# Patient Record
Sex: FEMALE | Race: WHITE | NOT HISPANIC OR LATINO | Employment: OTHER | URBAN - METROPOLITAN AREA
[De-identification: names, ages, dates, MRNs, and addresses within clinical notes are randomized per-mention and may not be internally consistent; named-entity substitution may affect disease eponyms.]

---

## 2024-06-29 ENCOUNTER — HOSPITAL ENCOUNTER (EMERGENCY)
Facility: CLINIC | Age: 24
Discharge: HOME | End: 2024-06-29
Attending: EMERGENCY MEDICINE | Admitting: EMERGENCY MEDICINE
Payer: COMMERCIAL

## 2024-06-29 VITALS
SYSTOLIC BLOOD PRESSURE: 108 MMHG | OXYGEN SATURATION: 95 % | RESPIRATION RATE: 18 BRPM | HEART RATE: 72 BPM | TEMPERATURE: 98.8 F | DIASTOLIC BLOOD PRESSURE: 71 MMHG

## 2024-06-29 DIAGNOSIS — F32.A DEPRESSION, UNSPECIFIED DEPRESSION TYPE: ICD-10-CM

## 2024-06-29 PROBLEM — F33.9 RECURRENT MAJOR DEPRESSIVE DISORDER (H): Status: ACTIVE | Noted: 2024-06-29

## 2024-06-29 PROBLEM — F41.1 GENERALIZED ANXIETY DISORDER: Status: ACTIVE | Noted: 2024-06-29

## 2024-06-29 PROCEDURE — 99283 EMERGENCY DEPT VISIT LOW MDM: CPT | Performed by: EMERGENCY MEDICINE

## 2024-06-29 PROCEDURE — 99284 EMERGENCY DEPT VISIT MOD MDM: CPT | Performed by: EMERGENCY MEDICINE

## 2024-06-29 RX ORDER — DAPSONE 100 MG/1
100 TABLET ORAL DAILY
COMMUNITY

## 2024-06-29 RX ORDER — PREDNISONE 5 MG/1
5 TABLET ORAL DAILY
COMMUNITY

## 2024-06-29 RX ORDER — COLCHICINE 0.6 MG/1
0.6 TABLET ORAL 2 TIMES DAILY
COMMUNITY

## 2024-06-29 ASSESSMENT — ACTIVITIES OF DAILY LIVING (ADL)
ADLS_ACUITY_SCORE: 35

## 2024-06-29 ASSESSMENT — COLUMBIA-SUICIDE SEVERITY RATING SCALE - C-SSRS
2. HAVE YOU ACTUALLY HAD ANY THOUGHTS OF KILLING YOURSELF IN THE PAST MONTH?: NO
6. HAVE YOU EVER DONE ANYTHING, STARTED TO DO ANYTHING, OR PREPARED TO DO ANYTHING TO END YOUR LIFE?: NO
1. IN THE PAST MONTH, HAVE YOU WISHED YOU WERE DEAD OR WISHED YOU COULD GO TO SLEEP AND NOT WAKE UP?: NO

## 2024-06-29 NOTE — ED PROVIDER NOTES
"    [facility] EMERGENCY DEPARTMENT ([facility])    6/29/24       ED PROVIDER NOTE    History     Chief Complaint   Patient presents with    Depression     Increasing depression and anxiety, \"I really need a therapist, the last few months I have been struggling mentally,\" pt states \"I think it is due to my autoimmune medications, tearful in triage.\"     HPI  Jolie Najera is a 24 year old female with a past medical history of depression and anxiety, who presents to the ED for evaluation of depression.     Patient reports that she is not suicidal but she is very struggling with depression.  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  She reports that she is been try to manage this on her own that she knows she cannot do anymore.  She does not follow with any outpatient mental health providers at this time including no therapist, psychiatrist.  She is not on any mental health medications.  In addition to the Otezla she is also on prednisone.  She reports in the past she has been diagnosed with depression and anxiety but is not currently being managed for this.      She does note vaginal bleeding, notes that she has had her period \"3 times this month\".  Is unclear about whether or not she could be pregnant.  She has no abdominal pain, tenderness or pelvic pain or tenderness.  She does not have any other medical concerns or complaints at this time.    Past Medical History  Past Medical History:   Diagnosis Date    Behcet recurrent disease (H)      Past Surgical History:   Procedure Laterality Date    ENT SURGERY      tonsillectomy and adenoidectomy     apremilast (OTEZLA) 30 MG tablet  colchicine (COLCRYS) 0.6 MG tablet  dapsone (ACZONE) 100 MG tablet  ESCITALOPRAM OXALATE PO  GABAPENTIN PO  GABAPENTIN PO  norgestimate-ethinyl estradiol (ORTHO-CYCLEN, SPRINTEC) 0.25-35 MG-MCG tablet  predniSONE (DELTASONE) 5 MG " tablet  traZODone (DESYREL) 100 MG tablet      No Known Allergies  Family History  No family history on file.  Social History   Social History     Tobacco Use    Smoking status: Some Days   Substance Use Topics    Alcohol use: No    Drug use: No      A complete review of systems was performed with pertinent positives and negatives noted in the HPI, and all other systems negative.    Physical Exam   BP: 109/72  Pulse: 90  Temp: 98.6  F (37  C)  Resp: 16  SpO2: 100 %  Physical Exam  General: awake, alert, NAD  Head: normal cephalic  HEENT: pupils equal, conjugate gaze intact  Neck: Supple  CV: regular rate and rhythm without murmur  Lungs: clear to auscultation  Abd: soft, non-tender, no guarding, no peritoneal signs  EXT: lower extremities without swelling or edema  Neuro: awake, answers questions appropriately. No focal deficits noted   Psych: Patient is  dressed no pressured speech or flight of ideas.  Does not appear to be responding to internal stimuli.  Appropriate for the weather, clean and groomed.  Makes good eye contact.  Endorses increasing depression but denies suicidal ideation.  Denies plan or recent attempt.    ED Course, Procedures, & Data      Procedures         No results found for any visits on 06/29/24.  Medications - No data to display  Labs Ordered and Resulted from Time of ED Arrival to Time of ED Departure - No data to display  No orders to display          Critical care was not performed.     Medical Decision Making  The patient's presentation was of high complexity (a chronic illness severe exacerbation, progression, or side effect of treatment).    The patient's evaluation involved:  ordering and/or review of 1 test(s) in this encounter (see separate area of note for details)  discussion of management or test interpretation with another health professional (mental health )    The patient's management necessitated moderate risk (making referrals for outpatient therapies based off of  ER evaluation).    Assessment & Plan    Marielos is a 24-year-old female who presents with depression, no suicidal ideation.  She is in the emergency department requesting expedited outpatient resources for her mental health.    Behavioral Health DEC assessment completed with the  recommending discharge with therapy.  Do feel the patient probably needs psychiatry involvement as well, would probably be better if she gets psychiatry through [facility] given that this could be related to her medications for Otezla.  Will recommend and put in a referral for prescriber through the [facility]  should there be a large delay in getting in with [facility].  See separate DEC note from today's date for details on the assessment.      Asking to get urine pregnancy test to rule out ectopic pregnancy as a cause of her abnormal uterine bleeding as a potential life-threatening cause of vaginal bleeding.  Patient declined at this time she would prefer to go home and follow-up with OB/GYN.  Stable vital signs, has no abdominal or pelvic pain, understands my concern she will be discharged per her request.      I have reviewed the nursing notes. I have reviewed the findings, diagnosis, plan and need for follow up with the patient.    New Prescriptions    No medications on file       Final diagnoses:   Depression, unspecified depression type       I, Js Barclay, am serving as a trained medical scribe to document services personally performed by Carlos Rodriguez MD based on the provider's statements to me on June 29, 2024.  This document has been checked and approved by the attending provider.    I, Carlos Rodriguez MD, was physically present and have reviewed and verified the accuracy of this note documented by Js Barclay medical scribe.      Carlos Rodriguez MD  [facility] EMERGENCY DEPARTMENT  6/29/2024     Carlos Rodriguez MD  06/29/24 2058

## 2024-06-30 NOTE — DISCHARGE INSTRUCTIONS
[facility] SCHEDULING:  Today you were seen by a licensed mental health professional through [facility] Behavioral Healthcare Providers ([facility])  for a crisis assessment in the Emergency Department at [facility].  It is recommended that you follow up with your estabished providers (psychiatrist, mental health therapist, and/or primary care doctor - as relevant) as soon as possible. Coordinators from [facility] will be calling you in the next 24-48 hours to ensure that you have the resources you need.  You can also contact [facility] coordinators directly at [phone].    You have been scheduled the following appointments:     Type: Teletherapy  Date: Tuesday, 7/2/2024  Time: 5:00 pm - 5:45 pm  Provider: Sugar Erwin MA  [facility]  Location: [facility]win, [address] [place] [address], [address], [place] [address]  Phone: [phone]  Patient Instructions  I am on [address], brick building, [address] (1st building when pulling in the parking lot) Go past [facility] next to [facility] on [address]. Next right. Can do U turns on [address] There are 4 offices. I am in the basement. Do not arrive early, I do not have a waiting room. Upstairs is a restroom. For Telehealth, Please have the customer call or text [phone] for Zoom link      We are making a referral to the transition clinic for bridging telemedicine appointments (med management) until your scheduled appointment.  They will be reaching out to you via e-mail or phone in the coming days.  They can be reached at [phone].       We are making a referral to [facility] Psychiatry on your behalf. They should be reaching out to you directly. If you do not hear from them in the next week, call them at [phone].      [facility] maintains an extensive network of licensed behavioral health providers to connect patients with the services they need.  We do not charge providers a fee to participate in our referral network.  We match patients with providers based on a  "patient s specific needs, insurance coverage, and location.  Our first effort will be to refer you to a provider within your care system, and will utilize providers outside your care system as needed.         Aftercare Plan  If I am feeling unsafe or I am in a crisis, I will:   Contact my established care providers   Call the [place] Suicide Prevention Lifeline: 988  Go to the nearest emergency room   Call 911     Warning signs that I or other people might notice when a crisis is developing for me: I shut myself off from others, I am irritable, and I am short with the kids    Things I am able to do on my own to cope or help me feel better: self-talk, positive affirmations, squeeze ice cubes     Things that I am able to do with others to cope or help me better: go for walks in nature      Changes I can make to support my mental health and wellness: follow up with [facility] resources     People in my life that I can ask for help: my partner, Niharika, and my sister Gloria     [facility] has a mental health crisis team you can call 24/7: [facility] Crisis  [phone]      Crisis Lines  Crisis Text Line  Text 158921  You will be connected with a trained live crisis counselor to provide support.    Por espanol, texto  JINA a 882910 o texto a 442-AYUDAME en WhatsApp    [employer] (LGBTQ Youth Crisis Line)  1.297.512.4961  text START to 275-149      Community Resources  Fast Tracker  Linking people to mental health and substance use disorder resources  fasttracki[employer]Z [employer] Solutionsn.org     [place] Mental Health Warm Line  Peer to peer support  Monday thru Saturday, 12 pm to 10 pm  931.218.3294 or 7.671.782.4821  Text \"Support\" to 34948    National [place] on Mental Illness (CHARU)  021.637.2275 or 1.888.CHARU.HELPS      Mental Health Apps  My3  https://myHealth Informaticspp.org/    VirtualHopeBox  https://RUSBASE.org/apps/virtual-hope-box/      Additional Information  Today you were seen by a licensed mental health professional " through Triage and Transition services, Behavioral Healthcare Providers ([facility])  for a crisis assessment in the Emergency Department at [facility].  It is recommended that you follow up with your established providers (psychiatrist, mental health therapist, and/or primary care doctor - as relevant) as soon as possible. Coordinators from [facility] will be calling you in the next 24-48 hours to ensure that you have the resources you need.  You can also contact [facility] coordinators directly at [phone]. You may have been scheduled for or offered an appointment with a mental health provider. [facility] maintains an extensive network of licensed behavioral health providers to connect patients with the services they need.  We do not charge providers a fee to participate in our referral network.  We match patients with providers based on a patient's specific needs, insurance coverage, and location.  Our first effort will be to refer you to a provider within your care system, and will utilize providers outside your care system as needed.

## 2024-06-30 NOTE — CONSULTS
"Diagnostic Evaluation Consultation  Crisis Assessment    Patient Name: Jolie Najera  Age:  24 year old  Legal Sex: female  Gender Identity: female  Pronouns:   Race: White  Ethnicity: Not  or   Language: English      Patient was assessed: In person   Crisis Assessment Start Date: 06/29/24  Crisis Assessment Start Time: 1825  Crisis Assessment Stop Time: 1905  Patient location: [facility] EMERGENCY DEPARTMENT                             ED11    Referral Data and Chief Complaint  Jolie Najera presents to the ED by  self. Patient is presenting to the ED for the following concerns: Anxiety, Depression.   Factors that make the mental health crisis life threatening or complex are:  Pt reports that she has been feeling increasingly depressed and anxious and she believes she needs a therapist.      Informed Consent and Assessment Methods  Explained the crisis assessment process, including applicable information disclosures and limits to confidentiality, assessed understanding of the process, and obtained consent to proceed with the assessment.  Assessment methods included conducting a formal interview with patient, review of medical records, collaboration with medical staff, and obtaining relevant collateral information from family and community providers when available.  : done     Patient response to interventions: eager to participate, acceptance expressed  Coping skills were attempted to reduce the crisis:  Pt reports that she tried to calm herself and reminded herself that this problem is only temporary.     History of the Crisis   Pt reports that she has been taking Otezla and Prednisone for a very long time, to treat her Behcets.  She explains that she had \"issues with mental health\" approximately 15 years ago.  She denies SI/HI/SIB, AH/VH at this time.  Instead, she states that she feels very sad, and at times she simply starts to cry.  Pt also reporting that she " "finds herself becoming very agitated, and \"short\" with her children, which is not \"how I normally am.\"  Because she cannot seem to get herself back to baseline, and given the current medication regimen she currently has, she is hoping to be provided with resources for a therapist.  She does carry diagnosis of MDD and anxiety and she does not take medication for either.  Pt reporting that she has an upcoming \"mental health\" check with the [facility] provider on Monday, at which time she will explain how she is feeling and inquire about medication.  Pt also plans to reach out to her physician at [place] to seek guidance on medication.    Brief Psychosocial History  Family:  Domestic Partnership, Children yes  Support System:  Significant Other, Children, Sibling(s), Parent(s)  Employment Status:  homemaker, employed full-time  Source of Income:  salary/wages  Financial Environmental Concerns:     Current Hobbies:  arts/crafts, cooking/baking, exercise/fitness, family functions, group/social activities, outdoor activities  Barriers in Personal Life:  mental health concerns    Significant Clinical History  Current Anxiety Symptoms:  anxious, racing thoughts  Current Depression/Trauma:  crying or feels like crying, hopelessness  Current Somatic Symptoms:  anxious  Current Psychosis/Thought Disturbance:  agitation  Current Eating Symptoms:     Chemical Use History:  Alcohol: None  Benzodiazepines: None  Opiates: None  Cocaine: None  Marijuana: None  Other Use: None   Past diagnosis:  Anxiety Disorder, Depression  Family history:  Depression  Past treatment:  Individual therapy  Details of most recent treatment:  Pt reports she had a mental health provider when she was in high school  Other relevant history:          Collateral Information  Is there collateral information: No (Writer left message for Taylor Goldstein, mom.[phone])     Collateral information name, relationship, phone number:       What happened today:       What " is different about patient's functioning:       Concern about alcohol/drug use:      What do you think the patient needs:      Has patient made comments about wanting to kill themselves/others:      If d/c is recommended, can they take part in safety/aftercare planning:       Additional collateral information:        Risk Assessment  [place] Suicide Severity Rating Scale Full Clinical Version:  Suicidal Ideation  Q6 Suicide Behavior (Lifetime): no       [place] Suicide Severity Rating Scale Recent:   Suicidal Ideation (Recent)  Q1 Wished to be Dead (Past Month): no  Q2 Suicidal Thoughts (Past Month): no  Level of Risk per Screen: no risks indicated  Intensity of Ideation (Recent)  Most Severe Ideation Rating (Past 1 Month):  (NA)  Frequency (Past 1 Month):  (NA)  Duration (Past 1 Month):  (NA)  Controllability (Past 1 Month):  (NA)  Deterrents (Past 1 Month): Does not apply  Reasons for Ideation (Past 1 Month): Does not apply  Suicidal Behavior (Recent)  Actual Attempt (Past 3 Months): No  Total Number of Actual Attempts (Past 3 Months): 0  Has subject engaged in non-suicidal self-injurious behavior? (Past 3 Months): No  Interrupted Attempts (Past 3 Months): No  Total Number of Interrupted Attempts (Past 3 Months): 0  Aborted or Self-Interrupted Attempt (Past 3 Months): No  Total Number of Aborted or Self-Interrupted Attempts (Past 3 Months): 0  Aborted or Self-Interrupted Attempt Description (Past 3 Months): 0  Preparatory Acts or Behavior (Past 3 Months): No  Total Number of Preparatory Acts (Past 3 Months): 0  Preparatory Acts or Behavior Description (Past 3 Months): 0    Environmental or Psychosocial Events: helplessness/hopelessness, other life stressors (Pt idenitifies Behcets as an ongoing struggle.)  Protective Factors: Protective Factors: strong bond to family unit, community support, or employment, intact marriage or domestic partnership, responsibilities and duties to others, including pets and  children, lives in a responsibly safe and stable environment, help seeking, able to access care without barriers, good problem-solving, coping, and conflict resolution skills, sense of belonging, optimistic outlook - identification of future goals    Does the patient have thoughts of harming others? Feels Like Hurting Others: no  Previous Attempt to Hurt Others: no  Is the patient engaging in sexually inappropriate behavior?: no    Is the patient engaging in sexually inappropriate behavior?  no        Mental Status Exam   Affect: Appropriate  Appearance:    Attention Span/Concentration: Attentive  Eye Contact: Engaged    Fund of Knowledge: Appropriate   Language /Speech Content: Fluent  Language /Speech Volume: Normal  Language /Speech Rate/Productions: Articulate  Recent Memory: Intact  Remote Memory: Intact  Mood: Anxious, Sad  Orientation to Person: Yes   Orientation to Place: Yes  Orientation to Time of Day: Yes  Orientation to Date: Yes     Situation (Do they understand why they are here?): Yes  Psychomotor Behavior: Normal  Thought Content: Clear  Thought Form: Intact     Mini-Cog Assessment  Number of Words Recalled:    Clock-Drawing Test:     Three Item Recall:    Mini-Cog Total Score:       Medication  Psychotropic medications:   Medication Orders - Psychiatric (From admission, onward)      None             Current Care Team  Patient Care Team:  Celia Esparza MD as PCP - General    Diagnosis  Patient Active Problem List   Diagnosis Code    History of substance abuse (H) F19.11    Encounter for counseling regarding initiation of other contraceptive measure Z30.09    Screen for STD (sexually transmitted disease) Z11.3    Generalized anxiety disorder F41.1    Recurrent major depressive disorder (H24) F33.9       Primary Problem This Admission  Active Hospital Problems    F 41.1 Generalized anxiety disorder      F 33.9 Recurrent major depressive disorder (H24)        Clinical Summary and Substantiation of  "Recommendations   Pt presents to the ED today for increased depression and anxiety.  She drove herself to the ED after having a conversation with her mom about how she has been feeling lately.  Pt currently followed by a Dr. Catracho Al at the [facility] as she is being treated for Bechets syndrome.  Pt follows a medicinal regimen to treat the syndrome, and one of the medications she is prescribed is Otezla.  A side effect that is common with that medicaiton is depression.  That said, pt describes herself as crying more often, being short with her children and very irritable.  She states that she works overnights and her partner works days thus, she gets few hours of sleep per night (typically 3-4 as of late) and she knows that is not enough.  Pt states that she simply cannot fall asleep, as she cannot shut her brain down.  Pt is denying SI/HI/SIB, AH/VH.  She believes that she needs a therapist, because she needs someone to talk to so that she does not feel \"crazy.\"  Pt presents as somewhat manic however, it is difficult to assess if the behaviors she exhibits are related to her medication or not.  Pt is encouraged  to follow up with her physician at [place] to inquire about drug interactions with anti-depressants and/or anti anxiety medications with her current regimen.  Pt does not meet criteria for IPMH.  That said, it seems that she will benefit from resources for a mental health provider, and a nurse practioner/psychiatrist with the [facility] who can likely see her before she gets in to [place].      Patient coping skills attempted to reduce the crisis:  Pt reports that she tried to calm herself and reminded herself that this problem is only temporary.    Disposition  Recommended disposition: Individual Therapy, Medication Management        Reviewed case and recommendations with attending provider. Attending Name: Dr. Carlos Rodriguez       Attending concurs with disposition: yes       Patient and/or validated " legal guardian concurs with disposition:   yes       Final disposition:  discharge    Legal status on admission:      Assessment Details   Total duration spent with the patient: 35 min     CPT code(s) utilized: 18993 - Psychotherapy for Crisis - 60 (30-74*) min    CAITLYN Reeves, Psychotherapist  DEC - Triage & Transition Services  Callback: [phone]

## 2024-07-01 ENCOUNTER — TELEPHONE (OUTPATIENT)
Dept: BEHAVIORAL HEALTH | Facility: CLINIC | Age: 24
End: 2024-07-01
Payer: COMMERCIAL

## 2024-07-01 NOTE — TELEPHONE ENCOUNTER
First attempt at reaching patient. Left message asking for a return call to schedule with the TC. Sent e-mail to e-mail address listed in referral. Will postpone for follow up tomorrow.    Maryann Burnham  Transition Clinic Coordinator  07/01/24 12:14 PM

## 2024-07-01 NOTE — TELEPHONE ENCOUNTER
----- Message from Devin PARKER sent at 6/29/2024  7:36 PM CDT -----  Regarding: Medication Management  Transition Clinic Referral   [place]       Please Check Type of Referral Requested:       ____THERAPY: The Transition clinic is able to schedule patients without current medical insurance; these patient will be referred to our Social Work Care Coordinator for Medical Insurance              Assistance. We are open for referral for psychotherapy. Patient is referred from: [facility]      __x__MEDICATION:   Referrals for Medication are ONLY accepted from the following areas (select): Emergency Department/Urgent Care - HIGH PRIORITY                                      Suboxone and Opioid Management Referrals are automatically denied.   TC Psychiatry cannot see patient without active medical insurance.   Next level of psychiatry care must be within 12 months.  TC Psychiatry cannot accept patient with next level of care scheduled with PCP.  The transition clinic cannot follow patients who are on a restricted recipient program.      Referring Provider Contact Name: Sneha Julian; Phone Number: [phone]    Reason for Transition Clinic Referral: Medication Management    Next Level of Care Patient Will Be Transitioned To: Home with OP Psychiatry  Provider(s)  Celia Esparza  Location [facility]  Date/Time Unknown    What Would Be Helpful from the Transition Clinic: Medication Management       Needs:NO    Does Patient Have Access to Technology: Yes    Patient E-mail Address: [email or web address]    Current Patient Phone Number: [phone]    Clinician Gender Preference (if applicable): NO    Patient location preference:[place]    (Master Form: Updated 11/28/2023)

## 2024-07-02 ENCOUNTER — TELEPHONE (OUTPATIENT)
Dept: BEHAVIORAL HEALTH | Facility: CLINIC | Age: 24
End: 2024-07-02
Payer: COMMERCIAL

## 2024-07-02 NOTE — TELEPHONE ENCOUNTER
Main Psychiatrist will be at [facility] where other providers are. Said appt with [facility] is pending, [facility] will be calling her as referral was placed to [facility] 3-4 days ago. Will be reaching out to [facility] in a couple days of doesn't hear back. Scheduled new Psychiatry appointment 7/11/2024.    Maryann Burnham  Transition Clinic Coordinator  07/02/24 2:34 PM

## 2024-07-02 NOTE — TELEPHONE ENCOUNTER
----- Message from Devin PARKER sent at 6/29/2024  7:36 PM CDT -----  Regarding: Medication Management  Transition Clinic Referral   [place]       Please Check Type of Referral Requested:       ____THERAPY: The Transition clinic is able to schedule patients without current medical insurance; these patient will be referred to our Social Work Care Coordinator for Medical Insurance              Assistance. We are open for referral for psychotherapy. Patient is referred from: [facility]      __x__MEDICATION:   Referrals for Medication are ONLY accepted from the following areas (select): Emergency Department/Urgent Care - HIGH PRIORITY                                      Suboxone and Opioid Management Referrals are automatically denied.   TC Psychiatry cannot see patient without active medical insurance.   Next level of psychiatry care must be within 12 months.  TC Psychiatry cannot accept patient with next level of care scheduled with PCP.  The transition clinic cannot follow patients who are on a restricted recipient program.      Referring Provider Contact Name: Sneha Julian; Phone Number: [phone]    Reason for Transition Clinic Referral: Medication Management    Next Level of Care Patient Will Be Transitioned To: Home with OP Psychiatry  Provider(s)  Celia Esparza  Location [facility]  Date/Time Unknown    What Would Be Helpful from the Transition Clinic: Medication Management       Needs:NO    Does Patient Have Access to Technology: Yes    Patient E-mail Address: [email or web address]    Current Patient Phone Number: [phone]    Clinician Gender Preference (if applicable): NO    Patient location preference:[place]    (Master Form: Updated 11/28/2023)

## 2024-07-02 NOTE — TELEPHONE ENCOUNTER
Second attempt at reaching patient. Left message asking for a return call to schedule with the TC. Referral will be closed, reply sent to referral source and tracker completed.    Maryann Burnham  Transition Clinic Coordinator  07/02/24 8:46 AM